# Patient Record
Sex: MALE | Race: WHITE | Employment: OTHER | URBAN - METROPOLITAN AREA
[De-identification: names, ages, dates, MRNs, and addresses within clinical notes are randomized per-mention and may not be internally consistent; named-entity substitution may affect disease eponyms.]

---

## 2018-02-02 ENCOUNTER — HOSPITAL ENCOUNTER (EMERGENCY)
Age: 38
Discharge: HOME | End: 2018-02-02
Attending: EMERGENCY MEDICINE
Payer: SELF-PAY

## 2018-02-02 ENCOUNTER — APPOINTMENT (OUTPATIENT)
Dept: GENERAL RADIOLOGY | Age: 38
End: 2018-02-02
Attending: PHYSICIAN ASSISTANT
Payer: SELF-PAY

## 2018-02-02 VITALS
SYSTOLIC BLOOD PRESSURE: 142 MMHG | DIASTOLIC BLOOD PRESSURE: 91 MMHG | TEMPERATURE: 98 F | HEART RATE: 83 BPM | RESPIRATION RATE: 16 BRPM | OXYGEN SATURATION: 99 %

## 2018-02-02 DIAGNOSIS — S61.217A LACERATION OF LEFT LITTLE FINGER WITH TENDON INVOLVEMENT: ICD-10-CM

## 2018-02-02 DIAGNOSIS — S61.215A LACERATION OF LEFT RING FINGER WITHOUT FOREIGN BODY WITHOUT DAMAGE TO NAIL, INITIAL ENCOUNTER: Primary | ICD-10-CM

## 2018-02-02 PROCEDURE — 75810000293 HC SIMP/SUPERF WND  RPR

## 2018-02-02 PROCEDURE — 73130 X-RAY EXAM OF HAND: CPT

## 2018-02-02 PROCEDURE — 74011000250 HC RX REV CODE- 250: Performed by: PHYSICIAN ASSISTANT

## 2018-02-02 PROCEDURE — 77030002986 HC SUT PROL J&J -A

## 2018-02-02 PROCEDURE — 99282 EMERGENCY DEPT VISIT SF MDM: CPT

## 2018-02-02 RX ORDER — SULFAMETHOXAZOLE AND TRIMETHOPRIM 800; 160 MG/1; MG/1
1 TABLET ORAL 2 TIMES DAILY
Qty: 14 TAB | Refills: 0 | Status: SHIPPED | OUTPATIENT
Start: 2018-02-02 | End: 2018-02-09

## 2018-02-02 RX ORDER — BUPIVACAINE HYDROCHLORIDE 2.5 MG/ML
10 INJECTION, SOLUTION EPIDURAL; INFILTRATION; INTRACAUDAL
Status: COMPLETED | OUTPATIENT
Start: 2018-02-02 | End: 2018-02-02

## 2018-02-02 RX ADMIN — BUPIVACAINE HYDROCHLORIDE 25 MG: 2.5 INJECTION, SOLUTION EPIDURAL; INFILTRATION; INTRACAUDAL; PERINEURAL at 09:59

## 2018-02-02 NOTE — DISCHARGE INSTRUCTIONS
Cuts on the Hand Closed With Stitches: Care Instructions  Your Care Instructions    A cut on your hand can be on your fingers, your thumb, or the front or back of your hand. Sometimes a cut can injure the tendons, blood vessels, or nerves of your hand. The doctor used stitches to close the cut. Using stitches also helps the cut heal and reduces scarring. The doctor may have given you a splint to help prevent you from moving your hand, fingers, or thumb. If the cut went deep and through the skin, the doctor put in two layers of stitches. The deeper layer brings the deep part of the cut together. These stitches will dissolve and don't need to be removed. The stitches in the upper layer are the ones you see on the cut. You will probably have a bandage. You will need to have the stitches removed, usually in 7 to 14 days. The doctor may suggest that you see a hand specialist if the cut is very deep or if you have trouble moving your fingers or have less feeling in your hand. The doctor has checked you carefully, but problems can develop later. If you notice any problems or new symptoms, get medical treatment right away. Follow-up care is a key part of your treatment and safety. Be sure to make and go to all appointments, and call your doctor if you are having problems. It's also a good idea to know your test results and keep a list of the medicines you take. How can you care for yourself at home? · Keep the cut dry for the first 24 to 48 hours. After this, you can shower if your doctor okays it. Pat the cut dry. · Don't soak the cut, such as in a bathtub. Your doctor will tell you when it's safe to get the cut wet. · If your doctor told you how to care for your cut, follow your doctor's instructions. If you did not get instructions, follow this general advice:  ¨ After the first 24 to 48 hours, wash around the cut with clean water 2 times a day.  Don't use hydrogen peroxide or alcohol, which can slow healing. ¨ You may cover the cut with a thin layer of petroleum jelly, such as Vaseline, and a nonstick bandage. ¨ Apply more petroleum jelly and replace the bandage as needed. · Prop up the sore hand on a pillow anytime you sit or lie down during the next 3 days. Try to keep it above the level of your heart. This will help reduce swelling. · Avoid any activity that could cause your cut to reopen. · Do not remove the stitches on your own. Your doctor will tell you when to come back to have the stitches removed. · Be safe with medicines. Take pain medicines exactly as directed. ¨ If the doctor gave you a prescription medicine for pain, take it as prescribed. ¨ If you are not taking a prescription pain medicine, ask your doctor if you can take an over-the-counter medicine. When should you call for help? Call your doctor now or seek immediate medical care if:  ? · You have new pain, or your pain gets worse. ? · The skin near the cut is cold or pale or changes color. ? · You have tingling, weakness, or numbness near the cut.   ? · The cut starts to bleed, and blood soaks through the bandage. Oozing small amounts of blood is normal.   ? · You have trouble moving the area of the hand near the cut.   ? · You have symptoms of infection, such as:  ¨ Increased pain, swelling, warmth, or redness around the cut. ¨ Red streaks leading from the cut. ¨ Pus draining from the cut. ¨ A fever. ? Watch closely for changes in your health, and be sure to contact your doctor if:  ? · You do not get better as expected. Where can you learn more? Go to http://armando-lisa.info/. Enter T250 in the search box to learn more about \"Cuts on the Hand Closed With Stitches: Care Instructions. \"  Current as of: March 20, 2017  Content Version: 11.4  © 6998-4590 [employer].  Care instructions adapted under license by [employer] (which disclaims liability or warranty for this information). If you have questions about a medical condition or this instruction, always ask your healthcare professional. [place] [address] any warranty or liability for your use of this information.

## 2018-02-02 NOTE — ED PROVIDER NOTES
EMERGENCY DEPARTMENT HISTORY AND PHYSICAL EXAM    Date: 2/2/2018  Patient Name: Emigdio Greene    History of Presenting Illness     Chief Complaint   Patient presents with    Laceration         History Provided By: Patient    Chief Complaint: laceration to the left 4th and 5th fingers  Duration: immediately PTA  Timing: acute  Location: left 4th and 5th fingers (dorsal aspect)  Modifying Factors: heavy bandaging controls the bleeding  Associated Symptoms: denies any other associated signs or symptoms    Additional History (Context):   9:53 AM   Emigdio Greene is a 40 y.o. male who presents to the emergency department C/O laceration to the dorsal aspect of the left 4th and 5th fingers sustained immediately PTA. Reports he was switching the glass to his truck window when he accidentally cut himself. Pt has applied heavy bandages to the fingers which controls the bleeding. Reports his Tetanus is UTD. Pt denies numbness and any other sxs or complaints. PCP: None        Past History     Past Medical History:  Past Medical History:   Diagnosis Date    Anxiety        Past Surgical History:  History reviewed. No pertinent surgical history. Family History:  History reviewed. No pertinent family history. Social History:  Social History   Substance Use Topics    Smoking status: None    Smokeless tobacco: None    Alcohol use None       Allergies: Allergies   Allergen Reactions    Penicillins Rash         Review of Systems   Review of Systems   Skin: Positive for wound (laceration to the left 4th and 5th fingers). Neurological: Negative for numbness. All other systems reviewed and are negative. Physical Exam     Vitals:    02/02/18 0959   BP: (!) 142/91   Pulse: 83   Resp: 16   Temp: 98 °F (36.7 °C)   SpO2: 99%     Physical Exam   Constitutional: He is oriented to person, place, and time. He appears well-developed and well-nourished. No distress. HENT:   Head: Normocephalic and atraumatic. Neck: Normal range of motion. Neck supple. Musculoskeletal:        Hands:  Neurological: He is alert and oriented to person, place, and time. Skin: Skin is warm and dry. He is not diaphoretic. Psychiatric: He has a normal mood and affect. His behavior is normal.   Nursing note and vitals reviewed. Diagnostic Study Results     Labs -   No results found for this or any previous visit (from the past 12 hour(s)). Radiologic Studies -     10:22 AM  RADIOLOGY FINDINGS  Left hand X-ray shows soft tissue injuries of the 4th and 5th fingers. No acute fracture or foreign body. Pending review by Radiologist  Recorded by Reynold Griggs ED Scribe, as dictated by Gerry Doherty PA-C     XR HAND LT MIN 3 V   Final Result   IMPRESSION:  1. Soft tissue injuries involving the ulnar aspect of the left hand ring and  small fingers without acute osseous abnormality or retained radiopaque foreign  Object. As read by the radiologist.      CT Results  (Last 48 hours)    None        CXR Results  (Last 48 hours)    None          Medications given in the ED-  Medications   bupivacaine (PF) (MARCAINE) 0.25 % (2.5 mg/mL) injection 25 mg (25 mg Peripheral Nerve Block Given by Provider 2/2/18 7463)         Medical Decision Making   I am the first provider for this patient. I reviewed the vital signs, available nursing notes, past medical history, past surgical history, family history and social history. Vital Signs-Reviewed the patient's vital signs. Pulse Oximetry Analysis - 99% on room air     Records Reviewed: Nursing Notes    Procedures:  Wound Repair  Date/Time: 2/2/2018 10:08 AM  Performed by: PAPreparation: skin prepped with Betadine and skin prepped with Shur-Clens  Pre-procedure re-eval: Immediately prior to the procedure, the patient was reevaluated and found suitable for the planned procedure and any planned medications.   Time out: Immediately prior to the procedure a time out was called to verify the correct patient, procedure, equipment, staff and marking as appropriate. .  Location details: left ring finger and left small finger  Anesthesia: digital block    Anesthesia:  Local Anesthetic: bupivacaine 0.25% without epinephrine (to 4th and 5th left fingers)  Foreign bodies: no foreign bodies  Skin closure: Prolene  Number of sutures: 9 sutures to the left 5th finger, 5 sutures to the left 4th finger. Technique: simple and interrupted  Approximation: close  Dressing: gauze roll and splint  Patient tolerance: Patient tolerated the procedure well with no immediate complications  My total time at bedside, performing this procedure was 16-30 minutes. ED Course:   9:53 AM Initial assessment performed. The patients presenting problems have been discussed, and they are in agreement with the care plan formulated and outlined with them. I have encouraged them to ask questions as they arise throughout their visit. Diagnosis and Disposition       DISCHARGE NOTE:  11:27 AM   Yung Weinberg  results have been reviewed with him. He has been counseled regarding his diagnosis, treatment, and plan. He verbally conveys understanding and agreement of the signs, symptoms, diagnosis, treatment and prognosis and additionally agrees to follow up as discussed. He also agrees with the care-plan and conveys that all of his questions have been answered. I have also provided discharge instructions for him that include: educational information regarding their diagnosis and treatment, and list of reasons why they would want to return to the ED prior to their follow-up appointment, should his condition change. He has been provided with education for proper emergency department utilization. CLINICAL IMPRESSION:    1. Laceration of left ring finger without foreign body without damage to nail, initial encounter    2. Laceration of left little finger with tendon involvement        PLAN:  1. D/C Home  2.    Discharge Medication List as of 2/2/2018 11:28 AM      START taking these medications    Details   trimethoprim-sulfamethoxazole (BACTRIM DS) 160-800 mg per tablet Take 1 Tab by mouth two (2) times a day for 7 days. , Print, Disp-14 Tab, R-0           3. Follow-up Information     Follow up With Details Comments [address], MD Schedule an appointment as soon as possible for a visit in 2 days For orthopedic follow up [address] EMERGENCY DEPT  As needed, If symptoms worsen [address] [place] [address]  [phone]        _______________________________    Attestations: This note is prepared by Reynold Griggs, acting as Scribe for [employer]. [employer], BRISA:  The scribe's documentation has been prepared under my direction and personally reviewed by me in its entirety.   I confirm that the note above accurately reflects all work, treatment, procedures, and medical decision making performed by me.  _______________________________

## 2018-02-02 NOTE — ED TRIAGE NOTES
Pt states replacing glass window on his truck when he cut his fingers.   Left 4th and 5th fingers lacerated

## 2018-02-28 ENCOUNTER — HOSPITAL ENCOUNTER (EMERGENCY)
Age: 38
Discharge: HOME | End: 2018-02-28
Attending: EMERGENCY MEDICINE
Payer: SELF-PAY

## 2018-02-28 VITALS
WEIGHT: 190 LBS | SYSTOLIC BLOOD PRESSURE: 146 MMHG | DIASTOLIC BLOOD PRESSURE: 85 MMHG | OXYGEN SATURATION: 100 % | RESPIRATION RATE: 18 BRPM | HEART RATE: 82 BPM | HEIGHT: 73 IN | TEMPERATURE: 98.1 F | BODY MASS INDEX: 25.18 KG/M2

## 2018-02-28 DIAGNOSIS — J02.9 ACUTE PHARYNGITIS, UNSPECIFIED ETIOLOGY: Primary | ICD-10-CM

## 2018-02-28 PROCEDURE — 99282 EMERGENCY DEPT VISIT SF MDM: CPT

## 2018-02-28 PROCEDURE — 87081 CULTURE SCREEN ONLY: CPT | Performed by: EMERGENCY MEDICINE

## 2018-02-28 RX ORDER — PREDNISONE 20 MG/1
60 TABLET ORAL DAILY
Qty: 15 TAB | Refills: 0 | Status: SHIPPED | OUTPATIENT
Start: 2018-02-28 | End: 2018-03-05

## 2018-02-28 RX ORDER — AZITHROMYCIN 250 MG/1
TABLET, FILM COATED ORAL
Qty: 6 TAB | Refills: 0 | Status: SHIPPED | OUTPATIENT
Start: 2018-02-28 | End: 2018-03-05

## 2018-02-28 NOTE — ED PROVIDER NOTES
EMERGENCY DEPARTMENT HISTORY AND PHYSICAL EXAM    Date: 2/28/2018  Patient Name: Vaibhav Rasmussen    History of Presenting Illness     Chief Complaint   Patient presents with    Sore Throat         History Provided By: Patient    Chief Complaint: sore throat  Duration: 3 Days  Timing:  Worsening  Location: throat  Quality: sore  Modifying Factors: throat lozenges without relief. Talking and swallowing exacerbate his sxs  Associated Symptoms: fever, diaphoresis    Additional History (Context):   1:06 PM   Vaibhav Rasmussen is a 40 y.o. male who presents to the emergency department C/O worsening sore throat starting 3 days ago. Associated sxs include subjective fever and diaphoresis last night, and dizziness. Pt has been using throat lozenges without relief and reports talking and swallowing exacerbate his sxs. Medical allergies to Penicillin. Pt denies congestion, cough, and any other sxs or complaints. PCP: None        Past History     Past Medical History:  Past Medical History:   Diagnosis Date    Anxiety        Past Surgical History:  History reviewed. No pertinent surgical history. Family History:  History reviewed. No pertinent family history. Social History:  Social History   Substance Use Topics    Smoking status: None    Smokeless tobacco: None    Alcohol use None       Allergies: Allergies   Allergen Reactions    Penicillins Rash         Review of Systems   Review of Systems   Constitutional: Positive for diaphoresis and fever (subjective). Negative for chills. HENT: Positive for sore throat. Negative for congestion. Respiratory: Negative for cough. Musculoskeletal: Negative for myalgias. Skin: Negative for rash. Allergic/Immunologic: Negative for immunocompromised state. Neurological: Positive for dizziness. Negative for headaches. Hematological: Negative for adenopathy. All other systems reviewed and are negative.       Physical Exam     Vitals:    02/28/18 1248   BP: 146/85   Pulse: 82   Resp: 18   Temp: 98.1 °F (36.7 °C)   SpO2: 100%   Weight: 86.2 kg (190 lb)   Height: 6' 1\" (1.854 m)     Physical Exam   Constitutional: He appears well-developed and well-nourished. No distress. Male in NAD. Alert. Appears comfortable. HENT:   Head: Normocephalic and atraumatic. Head is without right periorbital erythema and without left periorbital erythema. Right Ear: External ear normal. No drainage or swelling. Tympanic membrane is not perforated, not erythematous and not bulging. Left Ear: External ear normal. No drainage or swelling. Tympanic membrane is not perforated, not erythematous and not bulging. Nose: Nose normal. No mucosal edema or rhinorrhea. Right sinus exhibits no maxillary sinus tenderness and no frontal sinus tenderness. Left sinus exhibits no maxillary sinus tenderness and no frontal sinus tenderness. Mouth/Throat: Uvula is midline and mucous membranes are normal. No oral lesions. No trismus in the jaw. No dental abscesses or uvula swelling. Oropharyngeal exudate and posterior oropharyngeal erythema present. No posterior oropharyngeal edema or tonsillar abscesses. Eyes: Conjunctivae are normal. Right eye exhibits no discharge. Left eye exhibits no discharge. No scleral icterus. Neck: Normal range of motion. Cardiovascular: Normal rate, regular rhythm, normal heart sounds and intact distal pulses. Exam reveals no gallop and no friction rub. No murmur heard. Pulmonary/Chest: Effort normal and breath sounds normal. No accessory muscle usage. No tachypnea. No respiratory distress. He has no decreased breath sounds. He has no wheezes. He has no rhonchi. He has no rales. Musculoskeletal: Normal range of motion. Lymphadenopathy:     He has cervical adenopathy. Neurological: He is alert. Skin: Skin is warm and dry. He is not diaphoretic. Psychiatric: He has a normal mood and affect.  Judgment normal.   Nursing note and vitals reviewed. Diagnostic Study Results     Labs -   No results found for this or any previous visit (from the past 12 hour(s)). Radiologic Studies -   No orders to display     CT Results  (Last 48 hours)    None        CXR Results  (Last 48 hours)    None          Medications given in the ED-  Medications - No data to display      Medical Decision Making   I am the first provider for this patient. I reviewed the vital signs, available nursing notes, past medical history, past surgical history, family history and social history. Vital Signs-Reviewed the patient's vital signs. Pulse Oximetry Analysis - 100% on room air     Records Reviewed: Nursing Notes    Provider Notes (Medical Decision Making): URI, strep, sinusitis, mono, influenza, PNA, bronchitis. No evidence of PTA, RPA, or north's. Procedures:  Procedures    ED Course:   1:06 PM Initial assessment performed. The patients presenting problems have been discussed, and they are in agreement with the care plan formulated and outlined with them. I have encouraged them to ask questions as they arise throughout their visit. Diagnosis and Disposition     Afebrile. Lungs CTAB. Will tx with ABX. No evidence of PTA, RPA, or north's. Reasons to RTED discussed with pt. All questions answered. Pt feels comfortable going home at this time. Pt expressed understanding and he agrees with plan. DISCHARGE NOTE:  1:11 PM   Chris Carson's  results have been reviewed with him. He has been counseled regarding his diagnosis, treatment, and plan. He verbally conveys understanding and agreement of the signs, symptoms, diagnosis, treatment and prognosis and additionally agrees to follow up as discussed. He also agrees with the care-plan and conveys that all of his questions have been answered.   I have also provided discharge instructions for him that include: educational information regarding their diagnosis and treatment, and list of reasons why they would want to return to the ED prior to their follow-up appointment, should his condition change. He has been provided with education for proper emergency department utilization. CLINICAL IMPRESSION:    1. Acute pharyngitis, unspecified etiology        PLAN:  1. D/C Home  2. Discharge Medication List as of 2/28/2018  1:11 PM      START taking these medications    Details   predniSONE (DELTASONE) 20 mg tablet Take 3 Tabs by mouth daily for 5 days. Take with food. , Print, Disp-15 Tab, R-0      azithromycin (ZITHROMAX Z-KELLY) 250 mg tablet Take 2 pills on day 1 and 1 pill on days 2-5, Print, Disp-6 Tab, R-0           3. Follow-up Information     Follow up With Details Comments Contact Info    [facility] Go to For [place] [address] [facility], [address] [facility] [address] [facility] [address]    [facility] EMERGENCY DEPT  As needed, If symptoms worsen [address] [place] [address]  [phone]        _______________________________    Attestations: This note is prepared by Joaquin Roth, acting as Scribe for [employer]. [employer]:  The scribe's documentation has been prepared under my direction and personally reviewed by me in its entirety. I confirm that the note above accurately reflects all work, treatment, procedures, and medical decision making performed by me.

## 2018-03-02 LAB
B-HEM STREP THROAT QL CULT: NEGATIVE
B-HEM STREP THROAT QL CULT: NORMAL
BACTERIA SPEC CULT: NORMAL
SERVICE CMNT-IMP: NORMAL